# Patient Record
Sex: MALE | Race: BLACK OR AFRICAN AMERICAN | Employment: UNEMPLOYED | URBAN - METROPOLITAN AREA
[De-identification: names, ages, dates, MRNs, and addresses within clinical notes are randomized per-mention and may not be internally consistent; named-entity substitution may affect disease eponyms.]

---

## 2024-08-31 ENCOUNTER — HOSPITAL ENCOUNTER (EMERGENCY)
Facility: HOSPITAL | Age: 44
Discharge: HOME | End: 2024-08-31
Attending: STUDENT IN AN ORGANIZED HEALTH CARE EDUCATION/TRAINING PROGRAM
Payer: MEDICAID

## 2024-08-31 ENCOUNTER — HOSPITAL ENCOUNTER (EMERGENCY)
Facility: HOSPITAL | Age: 44
Discharge: HOME | End: 2024-08-31
Attending: EMERGENCY MEDICINE
Payer: MEDICAID

## 2024-08-31 VITALS
DIASTOLIC BLOOD PRESSURE: 66 MMHG | RESPIRATION RATE: 18 BRPM | SYSTOLIC BLOOD PRESSURE: 122 MMHG | TEMPERATURE: 98 F | OXYGEN SATURATION: 94 % | HEART RATE: 94 BPM

## 2024-08-31 VITALS
TEMPERATURE: 98 F | DIASTOLIC BLOOD PRESSURE: 94 MMHG | RESPIRATION RATE: 17 BRPM | SYSTOLIC BLOOD PRESSURE: 135 MMHG | HEART RATE: 100 BPM | OXYGEN SATURATION: 95 %

## 2024-08-31 DIAGNOSIS — R45.851 SUICIDAL IDEATION: ICD-10-CM

## 2024-08-31 DIAGNOSIS — R45.851 SUICIDAL THOUGHTS: ICD-10-CM

## 2024-08-31 DIAGNOSIS — Z91.148 NONCOMPLIANCE WITH MEDICATION REGIMEN: ICD-10-CM

## 2024-08-31 DIAGNOSIS — Z00.8 EVALUATION BY PSYCHIATRIC SERVICE REQUIRED: ICD-10-CM

## 2024-08-31 DIAGNOSIS — F14.90 COCAINE USE: ICD-10-CM

## 2024-08-31 DIAGNOSIS — F32.A ACUTE DEPRESSION: Primary | ICD-10-CM

## 2024-08-31 DIAGNOSIS — F19.10 POLYSUBSTANCE ABUSE (HCC): Primary | ICD-10-CM

## 2024-08-31 DIAGNOSIS — F43.23 ADJUSTMENT DISORDER WITH MIXED ANXIETY AND DEPRESSED MOOD: ICD-10-CM

## 2024-08-31 LAB
ALBUMIN SERPL-MCNC: 3.4 G/DL (ref 3.5–5)
ALBUMIN/GLOB SERPL: 1.1 (ref 1.1–2.2)
ALP SERPL-CCNC: 80 U/L (ref 45–117)
ALT SERPL-CCNC: 46 U/L (ref 12–78)
AMPHET UR QL SCN: NEGATIVE
ANION GAP SERPL CALC-SCNC: 4 MMOL/L (ref 5–15)
APAP SERPL-MCNC: <2 UG/ML (ref 10–30)
AST SERPL-CCNC: 69 U/L (ref 15–37)
BARBITURATES UR QL SCN: NEGATIVE
BASOPHILS # BLD: 0.1 K/UL (ref 0–0.1)
BASOPHILS NFR BLD: 1 % (ref 0–1)
BENZODIAZ UR QL: NEGATIVE
BILIRUB SERPL-MCNC: 0.5 MG/DL (ref 0.2–1)
BUN SERPL-MCNC: 20 MG/DL (ref 6–20)
BUN/CREAT SERPL: 18 (ref 12–20)
CALCIUM SERPL-MCNC: 8.6 MG/DL (ref 8.5–10.1)
CANNABINOIDS UR QL SCN: NEGATIVE
CHLORIDE SERPL-SCNC: 109 MMOL/L (ref 97–108)
CO2 SERPL-SCNC: 26 MMOL/L (ref 21–32)
COCAINE UR QL SCN: POSITIVE
COMMENT:: NORMAL
COMMENT:: NORMAL
CREAT SERPL-MCNC: 1.11 MG/DL (ref 0.7–1.3)
DIFFERENTIAL METHOD BLD: NORMAL
EOSINOPHIL # BLD: 0.1 K/UL (ref 0–0.4)
EOSINOPHIL NFR BLD: 1 % (ref 0–7)
ERYTHROCYTE [DISTWIDTH] IN BLOOD BY AUTOMATED COUNT: 14 % (ref 11.5–14.5)
ETHANOL SERPL-MCNC: <10 MG/DL (ref 0–0.08)
GLOBULIN SER CALC-MCNC: 3 G/DL (ref 2–4)
GLUCOSE SERPL-MCNC: 110 MG/DL (ref 65–100)
HCT VFR BLD AUTO: 39.7 % (ref 36.6–50.3)
HGB BLD-MCNC: 13 G/DL (ref 12.1–17)
IMM GRANULOCYTES # BLD AUTO: 0 K/UL (ref 0–0.04)
IMM GRANULOCYTES NFR BLD AUTO: 0 % (ref 0–0.5)
LYMPHOCYTES # BLD: 2.5 K/UL (ref 0.8–3.5)
LYMPHOCYTES NFR BLD: 24 % (ref 12–49)
Lab: ABNORMAL
MCH RBC QN AUTO: 26.9 PG (ref 26–34)
MCHC RBC AUTO-ENTMCNC: 32.7 G/DL (ref 30–36.5)
MCV RBC AUTO: 82.2 FL (ref 80–99)
METHADONE UR QL: NEGATIVE
MONOCYTES # BLD: 1 K/UL (ref 0–1)
MONOCYTES NFR BLD: 10 % (ref 5–13)
NEUTS SEG # BLD: 6.7 K/UL (ref 1.8–8)
NEUTS SEG NFR BLD: 64 % (ref 32–75)
NRBC # BLD: 0 K/UL (ref 0–0.01)
NRBC BLD-RTO: 0 PER 100 WBC
OPIATES UR QL: NEGATIVE
PCP UR QL: NEGATIVE
PLATELET # BLD AUTO: 245 K/UL (ref 150–400)
PMV BLD AUTO: 9.7 FL (ref 8.9–12.9)
POTASSIUM SERPL-SCNC: 3.8 MMOL/L (ref 3.5–5.1)
PROT SERPL-MCNC: 6.4 G/DL (ref 6.4–8.2)
RBC # BLD AUTO: 4.83 M/UL (ref 4.1–5.7)
SALICYLATES SERPL-MCNC: 2.7 MG/DL (ref 2.8–20)
SODIUM SERPL-SCNC: 139 MMOL/L (ref 136–145)
SPECIMEN HOLD: NORMAL
SPECIMEN HOLD: NORMAL
WBC # BLD AUTO: 10.4 K/UL (ref 4.1–11.1)

## 2024-08-31 PROCEDURE — 80143 DRUG ASSAY ACETAMINOPHEN: CPT

## 2024-08-31 PROCEDURE — 80307 DRUG TEST PRSMV CHEM ANLYZR: CPT

## 2024-08-31 PROCEDURE — 80179 DRUG ASSAY SALICYLATE: CPT

## 2024-08-31 PROCEDURE — 36415 COLL VENOUS BLD VENIPUNCTURE: CPT

## 2024-08-31 PROCEDURE — 90471 IMMUNIZATION ADMIN: CPT | Performed by: STUDENT IN AN ORGANIZED HEALTH CARE EDUCATION/TRAINING PROGRAM

## 2024-08-31 PROCEDURE — 6360000002 HC RX W HCPCS: Performed by: STUDENT IN AN ORGANIZED HEALTH CARE EDUCATION/TRAINING PROGRAM

## 2024-08-31 PROCEDURE — 6370000000 HC RX 637 (ALT 250 FOR IP): Performed by: STUDENT IN AN ORGANIZED HEALTH CARE EDUCATION/TRAINING PROGRAM

## 2024-08-31 PROCEDURE — 80053 COMPREHEN METABOLIC PANEL: CPT

## 2024-08-31 PROCEDURE — 82077 ASSAY SPEC XCP UR&BREATH IA: CPT

## 2024-08-31 PROCEDURE — 99285 EMERGENCY DEPT VISIT HI MDM: CPT

## 2024-08-31 PROCEDURE — 99283 EMERGENCY DEPT VISIT LOW MDM: CPT

## 2024-08-31 PROCEDURE — 85025 COMPLETE CBC W/AUTO DIFF WBC: CPT

## 2024-08-31 PROCEDURE — 90791 PSYCH DIAGNOSTIC EVALUATION: CPT

## 2024-08-31 PROCEDURE — 90714 TD VACC NO PRESV 7 YRS+ IM: CPT | Performed by: STUDENT IN AN ORGANIZED HEALTH CARE EDUCATION/TRAINING PROGRAM

## 2024-08-31 RX ORDER — TRAZODONE HYDROCHLORIDE 100 MG/1
100 TABLET ORAL NIGHTLY
Qty: 60 TABLET | Refills: 0 | Status: SHIPPED | OUTPATIENT
Start: 2024-08-31 | End: 2024-10-30

## 2024-08-31 RX ORDER — ACETAMINOPHEN 325 MG/1
650 TABLET ORAL
Status: COMPLETED | OUTPATIENT
Start: 2024-08-31 | End: 2024-08-31

## 2024-08-31 RX ORDER — SERTRALINE HYDROCHLORIDE 100 MG/1
100 TABLET, FILM COATED ORAL DAILY
Qty: 60 TABLET | Refills: 0 | Status: SHIPPED | OUTPATIENT
Start: 2024-08-31 | End: 2024-10-30

## 2024-08-31 RX ORDER — QUETIAPINE FUMARATE 100 MG/1
100 TABLET, FILM COATED ORAL DAILY
Qty: 60 TABLET | Refills: 0 | Status: SHIPPED | OUTPATIENT
Start: 2024-08-31 | End: 2024-10-30

## 2024-08-31 RX ADMIN — CLOSTRIDIUM TETANI TOXOID ANTIGEN (FORMALDEHYDE INACTIVATED) AND CORYNEBACTERIUM DIPHTHERIAE TOXOID ANTIGEN (FORMALDEHYDE INACTIVATED) 0.5 ML: 5; 2 INJECTION, SUSPENSION INTRAMUSCULAR at 07:04

## 2024-08-31 RX ADMIN — ACETAMINOPHEN 650 MG: 325 TABLET ORAL at 07:05

## 2024-08-31 ASSESSMENT — PAIN SCALES - GENERAL
PAINLEVEL_OUTOF10: 4
PAINLEVEL_OUTOF10: 10

## 2024-08-31 ASSESSMENT — ENCOUNTER SYMPTOMS
VOMITING: 0
DIARRHEA: 0
SHORTNESS OF BREATH: 0
COUGH: 0
EYE PAIN: 0
ABDOMINAL PAIN: 0
NAUSEA: 0
RHINORRHEA: 0
SORE THROAT: 0

## 2024-08-31 ASSESSMENT — PAIN - FUNCTIONAL ASSESSMENT
PAIN_FUNCTIONAL_ASSESSMENT: NONE - DENIES PAIN
PAIN_FUNCTIONAL_ASSESSMENT: NONE - DENIES PAIN
PAIN_FUNCTIONAL_ASSESSMENT: 0-10
PAIN_FUNCTIONAL_ASSESSMENT: ACTIVITIES ARE NOT PREVENTED

## 2024-08-31 ASSESSMENT — PAIN DESCRIPTION - LOCATION
LOCATION: OTHER (COMMENT)
LOCATION: ELBOW

## 2024-08-31 ASSESSMENT — PAIN DESCRIPTION - ORIENTATION: ORIENTATION: RIGHT;LEFT;ANTERIOR;POSTERIOR;UPPER;LOWER

## 2024-08-31 ASSESSMENT — PAIN DESCRIPTION - PAIN TYPE: TYPE: ACUTE PAIN

## 2024-08-31 ASSESSMENT — PAIN DESCRIPTION - DESCRIPTORS: DESCRIPTORS: DISCOMFORT

## 2024-08-31 ASSESSMENT — PAIN DESCRIPTION - ONSET: ONSET: ON-GOING

## 2024-08-31 ASSESSMENT — PAIN DESCRIPTION - FREQUENCY: FREQUENCY: CONTINUOUS

## 2024-08-31 NOTE — BSMART NOTE
Corie spoke to patient at bedside with provider present. Patient admitted he was \"kicked out of rehab 3 days ago for using crack\" and is now homeless. He admits to attempting to obtain admission to the psych hospital \"because I have no where to go.\" He states, \"If I could get back to my sister's in [facility] I wouldn't feel suicidal.\" This writer asked if he was suicidal now. Patient responded, \"No.\" He reiterated that his ultimate goal is to return to [facility] to live with his sister. Patient provided address which was given to charge to follow up with CM for medicab transport. Corie also spoke with on-call [facility] who agrees with plan of care and supports a diagnosis of malingering, as patient was at [facility] last night attempting to obtain a psych admission to use as temporary housing, was unsuccessful, and subsequently discharge and came to [facility] with same problem and presentation, feigning SI in an attempt to obtain a bed. Plan of care is to arrange transport to sister's home in [facility] and follow up with appropriate CSB. Eventually, CM successfully obtained transportation.

## 2024-08-31 NOTE — ED NOTES
Verbal shift change report given to WILLIAM Diallo (oncoming nurse) by WILLIAM Brand (offgoing nurse). Report included the following information ED SBAR.

## 2024-08-31 NOTE — ED PROVIDER NOTES
[facility] EMERGENCY DEP  EMERGENCY DEPARTMENT ENCOUNTER      Pt Name: Yrn Leger  MRN: [id number]  Birthdate 1980  Date of evaluation: 8/31/2024  Provider: Deni Patel MD    CHIEF COMPLAINT       Chief Complaint   Patient presents with    Suicidal       HISTORY OF PRESENT ILLNESS    HPI    43-year-old male presenting for suicidal ideation.  Patient brought in by EMS.  He was seen at an outside hospital for suicidal ideation about 8 hours ago.  Was assessed by psychiatry and discharged.  He subsequently tried to climb up a building and jump off of it, states that he slipped a few steps up and fell down.  He arrives with abrasions to both elbows and a small laceration to his finger.  He states he did not hit his head or lose consciousness.  He is reporting some back pain but is able to ambulate.  He is reporting auditory hallucinations of voices telling him to slit his throat.  He has been out of his psych medications recently.  He relapsed on crack cocaine, with last use earlier this evening.    Nursing notes reviewed.    REVIEW OF SYSTEMS     Review of Systems  Unless otherwise stated, a complete review of systems was asked of the patient. Pertinent positives are noted in the HPI section.    PAST MEDICAL HISTORY   No past medical history on file.    SURGICAL HISTORY     No past surgical history on file.    CURRENT MEDICATIONS       Discharge Medication List as of 8/31/2024  9:25 AM          ALLERGIES     Patient has no known allergies.    FAMILY HISTORY     No family history on file.     SOCIAL HISTORY       Social History     Socioeconomic History    Marital status: Unknown   Tobacco Use    Smoking status: Never    Smokeless tobacco: Never   Substance and Sexual Activity    Alcohol use: Not Currently    Drug use: Yes     Types: Other-see comments     Comment: crack       PHYSICAL EXAM       ED Triage Vitals [08/31/24 0602]   BP Systolic BP Percentile Diastolic BP Percentile Temp Temp Source Pulse  Respirations SpO2   (!) 207/127 -- -- 98.2 °F (36.8 °C) Oral 83 18 95 %      Height Weight         -- --           Physical Exam  Constitutional:       Appearance: Normal appearance.   HENT:      Head: Normocephalic and atraumatic.   Eyes:      Extraocular Movements: Extraocular movements intact.      Pupils: Pupils are equal, round, and reactive to light.   Cardiovascular:      Rate and Rhythm: Normal rate and regular rhythm.   Pulmonary:      Effort: Pulmonary effort is normal.      Breath sounds: Normal breath sounds.   Abdominal:      General: Abdomen is flat. There is no distension.   Musculoskeletal:         General: No deformity or signs of injury.      Cervical back: Normal range of motion and neck supple.      Comments: Abrasions bilateral elbows.  Superficial laceration to the right pointer finger.   Skin:     Coloration: Skin is not jaundiced.   Neurological:      General: No focal deficit present.      Mental Status: He is alert.   Psychiatric:         Mood and Affect: Mood normal.         DIAGNOSTIC RESULTS   EKG: If obtained, EKG interpretation provided in the ED course    RADIOLOGY:   No orders to display        LABS:  Labs Reviewed   COMPREHENSIVE METABOLIC PANEL - Abnormal; Notable for the following components:       Result Value    Chloride 109 (*)     Anion Gap 4 (*)     Glucose 110 (*)     AST 69 (*)     Albumin 3.4 (*)     All other components within normal limits   URINE DRUG SCREEN - Abnormal; Notable for the following components:    Cocaine, Urine Positive (*)     All other components within normal limits   SALICYLATE LEVEL - Abnormal; Notable for the following components:    Salicylate Lvl 2.7 (*)     All other components within normal limits   ACETAMINOPHEN LEVEL - Abnormal; Notable for the following components:    Acetaminophen Level <2 (*)     All other components within normal limits   CBC WITH AUTO DIFFERENTIAL   ETHANOL   EXTRA TUBES HOLD   EXTRA TUBES HOLD       All other labs were  within normal range or not returned as of this dictation.    EMERGENCY DEPARTMENT COURSE and DIFFERENTIAL DIAGNOSIS/MDM:   Vitals:    Vitals:    08/31/24 0602 08/31/24 0708 08/31/24 0815   BP: (!) 207/127  122/66   Pulse: 83 83 94   Resp: 18  18   Temp: 98.2 °F (36.8 °C)  98 °F (36.7 °C)   TempSrc: Oral     SpO2: 95%  94%       Medical Decision Making  43-year-old male presenting for suicidal ideation, recently seen and cleared by psych in an outside hospital.  Comes in after falling during an attempt to climb a building and jump.  No significant signs of trauma just some mild abrasions.  He is hypertensive, will treat his pain.  Will consult bsmart for their assessment.    Amount and/or Complexity of Data Reviewed  Independent Historian: EMS  External Data Reviewed: notes.  Labs: ordered.  ECG/medicine tests: ordered. Decision-making details documented in ED Course.    Risk  OTC drugs.  Prescription drug management.        REASSESSMENT     ED Course as of 08/31/24 2223   Sat Aug 31, 2024   0704 EKG 12 Lead  EKG interpreted by myself. Normal sinus rhythm. Normal rate, normal axis. No STEMI. Reassuring EKG. [AS]      ED Course User Index  [AS] Deni Patel MD       CONSULTS:  IP CONSULT TO BSMART  IP CONSULT TO CASE MANAGEMENT    PROCEDURES:  Procedures    FINAL IMPRESSION      1. Polysubstance abuse (HCC)    2. Suicidal thoughts          DISPOSITION/PLAN   DISPOSITION Decision To Discharge 08/31/2024 09:25:42 AM    (Please note that portions of this note were completed with a voice recognition program.  Efforts were made to edit the dictations but occasionally words are mis-transcribed.)    Deni Patel MD (electronically signed)  Emergency Attending Physician      Deni Patel MD  08/31/24 2224

## 2024-08-31 NOTE — BSMART NOTE
Writer consulted with patient regarding arrival at [facility] ER. Per medical records patient was assessed at [facility] ( 3 hours prior) for passive suicidal ideation and illicit substance use after being discharged from his sober living program earlier in the day. Per [facility] clinician report and assessment, patient did not meet criteria for admission due to no history of attempt, passive ideation and malingering but was provided with community resources and homeless shelter information prior to hospital discharge. During consult with this writer the patient expressed that he was not happy with the disposition of the case and after discharge he was attempting to climb the hospital building until [place] Police were contacted and transported him to a [email or web address] station, which was his requested location. [facility] he contacted EMS expressing suicidal ideation and requested to be transported to [facility] for evaluation. Patient is now expressing to this writer that he will attempt to self-harm/kill himself if discharged from the hospital. Patient is not reporting a specific plan but vaguely expresses climbing a building to harm himself.     This writer attempted to consult with On-psychiatry for processing of case, a message was left requesting call-back. This writer writer did inform attending medical staff that patient will require a sitter while in ER.

## 2024-08-31 NOTE — CARE COORDINATION
CM was called and asked to set up pt's transportation to his sister's home at [address] [place] [address]. CM called [facility] [employer] ([phone]) to set this up, but there was no option to speak with a live agent so CM unable to use Medicaid for the transportation. CM set up an Lyft/cab through [employer] for this pt's transportation at 9:30am. CM called ED registration to let them know. CAIT Villegas, ACM-SW

## 2024-08-31 NOTE — ED PROVIDER NOTES
EMERGENCY DEPARTMENT HISTORY AND PHYSICAL EXAM            Please note that this dictation was completed with the assistance of \"Dragon\", the computer voice recognition software. Quite often unanticipated grammatical, syntax, homophones, and other interpretive errors are inadvertently transcribed by the computer software. Please disregard these errors and any errors that have escaped final proofreading. Thank you.    Date of Evaluation: 08/31/24  Patient: Yrn Leger  Patient Age and Sex: 43 y.o. male   MRN: [id number]  CSN: [id number]  PCP: No primary care provider on file.    History of Present Illness     Chief Complaint   Patient presents with    Suicidal     History Provided By: Patient/family/EMS (if available)    History is limited by: Nothing     HPI: Yrn Leger, 43 y.o. male with past medical history as documented below presents to the ED with c/o of 2 days of suicidal ideations.  Patient has no current plan.  Patient reports currently in a rehab place for the past 6 months but was kicked out 3 days ago because he relapsed on crack cocaine.  Patient denies any HI.  No hallucinations.  Patient states that he is prescribed several antipsychotic medications but has been noncompliant for several weeks.. Pt denies any other exacerbating or ameliorating factors. There are no other complaints, changes or physical findings pertinent to the HPI at this time.    Nursing notes were all reviewed and agreed with or any disagreements were addressed in the HPI.    Past History   Past Medical History:  History reviewed. No pertinent past medical history.    Past Surgical History:  History reviewed. No pertinent surgical history.    Family History:   Family history reviewed and was non-contributory, unless specified below:  History reviewed. No pertinent family history.    Social History:  Social History     Tobacco Use    Smoking status: Never    Smokeless tobacco: Never   Substance Use Topics    Alcohol use: Not

## 2024-08-31 NOTE — ED TRIAGE NOTES
Pt reports SI x2 haritha. Pt has no plan, pt has never attempted suicide before. Pt aox4 and ambulatory. Denies hallucinations. Pt admits to using \"crack\" 2 hours ago.

## 2024-08-31 NOTE — ED TRIAGE NOTES
BIBA pt presents to ED co SI with plan and intent. Pt states that he tried to climb up a building to jump off of it, but slipped an fell. Pt noted to have abrasions to both elbows. Pt also admits to  stating that voices were telling him to slit his throat with a knife. Pt denies HI. Pt states he was recently admitted and released from a rehabilitation, and has not been on his psych medications since. Pt also states that he was asked to leave the rehab program due to him relapsing on crack. Pt last used approximately 5 hours ago. EMS found patient outside of building smoking a cigarette.     Only other complaint is whole body pain. Pt calm and cooperative at this time.     PMH: Depression, anxiety

## 2024-08-31 NOTE — DISCHARGE INSTRUCTIONS
[facility] Substance Abuse Treatment Resources    [facility]  [address] [place] [address]       Appointments scheduled using triage protocol. Patients will be evaluated and referred to appropriate treatment. A  is usually assigned, but there is usually a waiting list. All [place] residents without financial resources may be referred to [facility]. Patients must bring proof that they are residents of the [facility] (utility bill, rent receipt, picture ID, etc.).   [phone]  Crisis: [phone]   [employer] - ALL g4interactive SERVICE REFERRALS MUST GO THROUGH [facility]  [facility]  [address] [facility]    [facility]  [address] [facility]    [facility]  [address] [place] [address]     [phone]  Detox unit: [phone]   Human Resources, [facility]  [address] [facility]       Intakes are Monday, Wednesday, Thursday from 8 AM - 12 noon. Patients may walk in any day and speak with a counselor. Patient must bring a picture ID.   [phone]   [facility]  [address] [facility]       No detox available. Patient must be medically stable and able to work. Patient needs social security card and an ID. Patient does not need to be homeless.   [phone]   The [address]       Detoxification available. Patients must be medically-cleared to go to detox and must be free of benzodiazepines and barbituates. THP prefers if they also have Clonidine available to help them with their detox.   [phone]   [facility]  [address] [facility]       Rastafari-based AA program available for men. Patients need to be able to work and follow rules. 90 days inpatient followed by 90 days in a alf house.   [phone]   [facility] that hosts a number of AA and NA groups each week   [phone]   [facility]  [facility] [address]        Patients must first go through registration and financial eligibility screening, 8 - 11:30 AM and 1 - 4 PM Mondays through Friday. [facility] also provides homeless services, vision, dental and medical services.   [phone]   [facility]  [address]       Provides outpatient and some inpatient (sober living houses) for men and women. Fees are determined at time of admission. Patient must be able to work and follow rules.   [phone]   Located within [facility]  [address] [facility]       Outpatient program for men, women and adolescents. Assessments can usually be scheduled within 24 hours. Intensive outpatient programs also available. Methadone and Suboxone detoxification also available. They do not accept Medicaid or Medicare.   [phone]   [facility]  [place]: [address] [facility]  [place]: [address] [facility]   Centralized Intake: [phone]  Crisis: [phone]   [facility]  [address] [facility].   [phone]  Crisis: [phone]   [facility]  [address] [facility]   [phone]  Crisis: [phone]   Local AA Office/Meeting Information   [phone]         [facility] Outpatient Mental Health Providers    Accepts Insured Patients Only:  [facility]  [address]       [phone]   Near the [facility] and [place] in the near west end of [facility].  Accepts most insurance including Medicaid/Medicare.  No psychiatry.  On the [facility] bus line.    [facility]  [address] ([place])     [phone] [facility]     [phone] [facility] ([place])     [phone]   Accepts most major insurances.  Psychiatry available.  Some DBT groups.    [facility])    [phone]   Mixture of psychologists and psychiatrists.  They do not accept Medicaid or Medicare.    The [address]

## 2024-08-31 NOTE — BSMART NOTE
Comprehensive Assessment Form Part 1      Section I - Disposition    Primary Diagnosis: Malingering  Adjustment Mood Disorder with depressed mood  Secondary Diagnosis: Cocaine Use Disorder    The Medical Doctor to Psychiatrist conference was notcompleted.  The Medical Doctor is in agreement with Psychiatrist disposition because of (reason) ED provider in agreement.  The plan is discharge with resources.  The on-call Psychiatrist consulted was Dr. perez.  The admitting Psychiatrist will be Dr. perez.  The admitting Diagnosis is none.  The Payor source is  [facility] .  The name of the representative was .  This was     This writer reviewed the [place] Suicide Severity Rating Scale in nursing flowsheet and the risk level assigned is low risk_.  Based on this assessment, the risk of suicide is low risk and the plan is discharge with resources Crisis Stabilization/ Substance Treatment .    Section II - Integrated Summary  Summary:  Triage:     Pt reports SI x2 haritha. Pt has no plan, pt has never attempted suicide before. Pt aox4 and ambulatory. Denies hallucinations. Pt admits to using \"crack\" 2 hours ago.           At bedside, patient reported suicidal thoughts over past 3 days without a plan. Patient denied homicidal thoughts and hallucinations. Patient reported suicidal thoughts started after [facility] because he used drugs. As reported he was there for 6 months. Patient is from [facility] and does not have anywhere to go. Patient denied any other stressors aside from being put out program and no having any where to go. Patient reported cocaine use about 3 hours ago. Patient denied other illicit substance use. Patient has had previous admission. Patient does not have any mental health providers and hasnot had in while as reported.  Patient does not have history of seizures, no devices. This writer discussed with patient resources for Crisis Stabilization and

## 2024-08-31 NOTE — ED NOTES
Decision to discharge made by provider and BSMART. Lyft set up by RUTHIE. AVS reviewed with patient. Educated about return precautions and follow up care, opportunity for questions provided. Patient in agreement with plan of care and disposition. Patient ambulatory out of ED with strong, steady gait

## 2024-08-31 NOTE — BSMART NOTE
BSMART assessment completed, and suicide risk level noted to be low risk. Primary Nurse Jantete and Charge Nurse Esteban and Physician Jaylan notified. Concerns not observed.